# Patient Record
Sex: FEMALE | Race: BLACK OR AFRICAN AMERICAN | NOT HISPANIC OR LATINO | Employment: UNEMPLOYED | URBAN - METROPOLITAN AREA
[De-identification: names, ages, dates, MRNs, and addresses within clinical notes are randomized per-mention and may not be internally consistent; named-entity substitution may affect disease eponyms.]

---

## 2018-12-24 ENCOUNTER — HOSPITAL ENCOUNTER (EMERGENCY)
Facility: HOSPITAL | Age: 33
Discharge: HOME | End: 2018-12-24
Attending: EMERGENCY MEDICINE | Admitting: EMERGENCY MEDICINE

## 2018-12-24 VITALS
TEMPERATURE: 97.7 F | HEART RATE: 66 BPM | OXYGEN SATURATION: 98 % | RESPIRATION RATE: 16 BRPM | HEIGHT: 60 IN | DIASTOLIC BLOOD PRESSURE: 85 MMHG | BODY MASS INDEX: 26.25 KG/M2 | WEIGHT: 133.7 LBS | SYSTOLIC BLOOD PRESSURE: 125 MMHG

## 2018-12-24 DIAGNOSIS — R11.2 NON-INTRACTABLE VOMITING WITH NAUSEA, UNSPECIFIED VOMITING TYPE: ICD-10-CM

## 2018-12-24 DIAGNOSIS — R55 NEAR SYNCOPE: Primary | ICD-10-CM

## 2018-12-24 LAB
ANION GAP SERPL CALCULATED.3IONS-SCNC: 10.9 MMOL/L
BASOPHILS # BLD AUTO: 0.02 10*3/MM3 (ref 0–0.2)
BASOPHILS NFR BLD AUTO: 0.2 % (ref 0–1.5)
BUN BLD-MCNC: 10 MG/DL (ref 6–20)
BUN/CREAT SERPL: 16.1 (ref 7–25)
CALCIUM SPEC-SCNC: 9.2 MG/DL (ref 8.6–10.5)
CHLORIDE SERPL-SCNC: 99 MMOL/L (ref 98–107)
CO2 SERPL-SCNC: 27.1 MMOL/L (ref 22–29)
CREAT BLD-MCNC: 0.62 MG/DL (ref 0.57–1)
DEPRECATED RDW RBC AUTO: 46.7 FL (ref 37–54)
EOSINOPHIL # BLD AUTO: 0.02 10*3/MM3 (ref 0–0.7)
EOSINOPHIL NFR BLD AUTO: 0.2 % (ref 0.3–6.2)
ERYTHROCYTE [DISTWIDTH] IN BLOOD BY AUTOMATED COUNT: 12.7 % (ref 11.7–13)
GFR SERPL CREATININE-BSD FRML MDRD: 133 ML/MIN/1.73
GLUCOSE BLD-MCNC: 101 MG/DL (ref 65–99)
HCG SERPL QL: NEGATIVE
HCT VFR BLD AUTO: 40 % (ref 35.6–45.5)
HGB BLD-MCNC: 13.1 G/DL (ref 11.9–15.5)
IMM GRANULOCYTES # BLD AUTO: 0.02 10*3/MM3 (ref 0–0.03)
IMM GRANULOCYTES NFR BLD AUTO: 0.2 % (ref 0–0.5)
LYMPHOCYTES # BLD AUTO: 2.66 10*3/MM3 (ref 0.9–4.8)
LYMPHOCYTES NFR BLD AUTO: 33 % (ref 19.6–45.3)
MCH RBC QN AUTO: 33.1 PG (ref 26.9–32)
MCHC RBC AUTO-ENTMCNC: 32.8 G/DL (ref 32.4–36.3)
MCV RBC AUTO: 101 FL (ref 80.5–98.2)
MONOCYTES # BLD AUTO: 0.65 10*3/MM3 (ref 0.2–1.2)
MONOCYTES NFR BLD AUTO: 8.1 % (ref 5–12)
NEUTROPHILS # BLD AUTO: 4.7 10*3/MM3 (ref 1.9–8.1)
NEUTROPHILS NFR BLD AUTO: 58.3 % (ref 42.7–76)
PLATELET # BLD AUTO: 206 10*3/MM3 (ref 140–500)
PMV BLD AUTO: 9.6 FL (ref 6–12)
POTASSIUM BLD-SCNC: 4.2 MMOL/L (ref 3.5–5.2)
RBC # BLD AUTO: 3.96 10*6/MM3 (ref 3.9–5.2)
SODIUM BLD-SCNC: 137 MMOL/L (ref 136–145)
WBC NRBC COR # BLD: 8.07 10*3/MM3 (ref 4.5–10.7)

## 2018-12-24 PROCEDURE — 85025 COMPLETE CBC W/AUTO DIFF WBC: CPT | Performed by: EMERGENCY MEDICINE

## 2018-12-24 PROCEDURE — 99284 EMERGENCY DEPT VISIT MOD MDM: CPT

## 2018-12-24 PROCEDURE — 84703 CHORIONIC GONADOTROPIN ASSAY: CPT | Performed by: EMERGENCY MEDICINE

## 2018-12-24 PROCEDURE — 80048 BASIC METABOLIC PNL TOTAL CA: CPT | Performed by: EMERGENCY MEDICINE

## 2018-12-24 RX ORDER — HYDROCHLOROTHIAZIDE 12.5 MG/1
12.5 CAPSULE, GELATIN COATED ORAL DAILY
COMMUNITY
End: 2022-11-08 | Stop reason: ALTCHOICE

## 2018-12-24 RX ORDER — SODIUM CHLORIDE 0.9 % (FLUSH) 0.9 %
10 SYRINGE (ML) INJECTION AS NEEDED
Status: DISCONTINUED | OUTPATIENT
Start: 2018-12-24 | End: 2018-12-24 | Stop reason: HOSPADM

## 2018-12-24 RX ADMIN — SODIUM CHLORIDE 1000 ML: 9 INJECTION, SOLUTION INTRAVENOUS at 02:44

## 2022-11-08 ENCOUNTER — OFFICE VISIT (OUTPATIENT)
Dept: CARDIOLOGY | Facility: CLINIC | Age: 37
End: 2022-11-08

## 2022-11-08 VITALS
HEIGHT: 61 IN | HEART RATE: 63 BPM | DIASTOLIC BLOOD PRESSURE: 80 MMHG | BODY MASS INDEX: 24.73 KG/M2 | OXYGEN SATURATION: 100 % | WEIGHT: 131 LBS | SYSTOLIC BLOOD PRESSURE: 122 MMHG

## 2022-11-08 DIAGNOSIS — R06.09 DYSPNEA ON EXERTION: Primary | ICD-10-CM

## 2022-11-08 DIAGNOSIS — I10 PRIMARY HYPERTENSION: ICD-10-CM

## 2022-11-08 PROCEDURE — 93000 ELECTROCARDIOGRAM COMPLETE: CPT | Performed by: STUDENT IN AN ORGANIZED HEALTH CARE EDUCATION/TRAINING PROGRAM

## 2022-11-08 PROCEDURE — 99204 OFFICE O/P NEW MOD 45 MIN: CPT | Performed by: STUDENT IN AN ORGANIZED HEALTH CARE EDUCATION/TRAINING PROGRAM

## 2022-11-08 RX ORDER — LOSARTAN POTASSIUM AND HYDROCHLOROTHIAZIDE 25; 100 MG/1; MG/1
1 TABLET ORAL DAILY
COMMUNITY
Start: 2022-09-28

## 2022-11-08 RX ORDER — ERGOCALCIFEROL 1.25 MG/1
50000 CAPSULE ORAL WEEKLY
COMMUNITY
Start: 2022-10-19

## 2022-11-08 NOTE — PROGRESS NOTES
Subjective:     Encounter Date:2022      Patient ID: Handy Reed is a 37 y.o. female.    Chief Complaint:  Establish care    HPI:   37 y.o. female  with a past medical history significant for hypertension who presents to [facility].  Patient notes that about 5 to 6 months ago she was experiencing left-sided chest pain that radiated to her jaw.  It occurred intermittently and at random times.  At times it was associate with exertion, other times it was not.  She went to her primary care physician who obtained an EKG that was read to be abnormal so she was referred to a cardiologist.  At the cardiologist's office, she had repeat EKG that was again deemed to be abnormal and so a stress test was ordered.  Unfortunately one of her family members  around the time that the stress test was scheduled so she had to cancel and was never called to reschedule.  Over the last couple of months her symptoms have completely resolved.  She no longer has any chest pain.  She unfortunately had COVID in early September and since then has had some mild dyspnea on exertion after about 10 minutes of walking.  Otherwise she has felt well.    Of note, she denies any family history of heart disease including MIs, heart failure, sudden cardiac death.    The following portions of the patient's history were reviewed and updated as appropriate: allergies, current medications, past family history, past medical history, past social history, past surgical history and problem list.     REVIEW OF SYSTEMS:   All systems reviewed.  Pertinent positives identified in HPI.  All other systems are negative.    Past Medical History:   Diagnosis Date   • Abnormal EKG     stated in  [facility] office note-dd   • Chest pain     stated in  [facility] office note-dd   • History of cancer     ovarian-stated in  [facility] office note-dd   • HPV (human papilloma virus) anogenital infection      stated in 9-2022 [facility] office note-dd   • Hypertension     stated in 9-2022 [facility] office note-dd   • Night sweat     stated in 7-2022 [facility] office note-dd   • Stress     stated in 9-2022 [facility] office note-dd   • Vitamin D deficiency     stated in 7-2022 [facility] office note-dd       Family History   Problem Relation Age of Onset   • Hypertension Mother    • Hypertension Maternal Grandmother        Social History     Socioeconomic History   • Marital status: Single   Tobacco Use   • Smoking status: Unknown   Vaping Use   • Vaping Use: Unknown   Substance and Sexual Activity   • Alcohol use: Yes     Comment: Sometimes   • Drug use: Yes     Types: Marijuana   • Sexual activity: Defer       No Known Allergies    Past Surgical History:   Procedure Laterality Date   • TUBAL ABDOMINAL LIGATION     • WART REMOVAL           ECG 12 Lead    Date/Time: 11/8/2022 12:30 PM  Performed by: Sukh Griffith MD  Authorized by: Sukh Griffith MD   Comparison: compared with previous ECG from 10/17/2016  Comparison to previous ECG: T wave are flattened inferolaterally  Rhythm: sinus rhythm  Rate: normal  Conduction: conduction normal  T flattening: V4, V5, V6, III, aVF and II  QRS axis: normal    Clinical impression: non-specific ECG               Objective:         Vitals:    11/08/22 1152   BP: 122/80   Pulse: 63   SpO2: 100%       PHYSICAL EXAM:  GEN: well appearing, in NAD   HEENT: NCAT, EOMI, moist mucus membranes   Respiratory: CTAB, no wheezes, rales or rhonchi  CV: normal rate, regular rhythm, normal S1, S2, no murmurs, rubs, gallops, +2 radial pulses b/l  GI: soft, nontender, nondistended  MSK: no edema  Skin: no rash, warm, dry  Heme/Lymph: no bruising or bleeding  Psych: organized thought, normal behavior and affect  Neuro: Alert and Oriented x 3, grossly normal motor function        Assessment:         (R06.09) Dyspnea on exertion    (I10) Primary  hypertension       Plan:       #Dyspnea on exertion  Patient previously had episodes of chest pain that have resolved on their own.  She has not had any episodes in the last 4 to 5 months.  She had COVID in early September and since then has had mild dyspnea on exertion.  Her EKG is normal sinus rhythm with nonspecific ST-T flattening in the inferolateral leads.  She is low risk for any heart disease as her only risk factor is hypertension.  Her dyspnea exertion is likely secondary to her recent COVID infection.  - Defer any further cardiac testing at this time  - Patient to return if her symptoms worsen or if her chest pain returns    #Hypertension  Currently very well controlled.  -Continue losartan-hydrochlorothiazide 100-25 mg daily  -Continue amlodipine 10 mg daily    Hannah Aquino, thank you very much for referring this kind patient to me. Please call me with any questions or concerns. I will see the patient again in the office as needed.         Sukh Griffith MD  11/08/22  [facility]    Outpatient Encounter Medications as of 11/8/2022   Medication Sig Dispense Refill   • losartan-hydrochlorothiazide (HYZAAR) 100-25 MG per tablet Take 1 tablet by mouth Daily.     • vitamin D (ERGOCALCIFEROL) 1.25 MG (57106 UT) capsule capsule Take 1 capsule by mouth 1 (One) Time Per Week.     • [DISCONTINUED] hydrochlorothiazide (MICROZIDE) 12.5 MG capsule Take 12.5 mg by mouth Daily.       No facility-administered encounter medications on file as of 11/8/2022.

## 2023-03-25 ENCOUNTER — HOSPITAL ENCOUNTER (EMERGENCY)
Facility: HOSPITAL | Age: 38
Discharge: HOME | End: 2023-03-25
Attending: EMERGENCY MEDICINE | Admitting: EMERGENCY MEDICINE
Payer: COMMERCIAL

## 2023-03-25 ENCOUNTER — APPOINTMENT (OUTPATIENT)
Dept: GENERAL RADIOLOGY | Facility: HOSPITAL | Age: 38
End: 2023-03-25
Payer: COMMERCIAL

## 2023-03-25 VITALS
SYSTOLIC BLOOD PRESSURE: 123 MMHG | HEIGHT: 59 IN | OXYGEN SATURATION: 99 % | DIASTOLIC BLOOD PRESSURE: 84 MMHG | BODY MASS INDEX: 24.19 KG/M2 | HEART RATE: 73 BPM | TEMPERATURE: 97 F | WEIGHT: 120 LBS | RESPIRATION RATE: 14 BRPM

## 2023-03-25 DIAGNOSIS — R07.89 ATYPICAL CHEST PAIN: Primary | ICD-10-CM

## 2023-03-25 LAB
ALBUMIN SERPL-MCNC: 4.6 G/DL (ref 3.5–5.2)
ALBUMIN/GLOB SERPL: 1.5 G/DL
ALP SERPL-CCNC: 69 U/L (ref 39–117)
ALT SERPL W P-5'-P-CCNC: 19 U/L (ref 1–33)
ANION GAP SERPL CALCULATED.3IONS-SCNC: 11.8 MMOL/L (ref 5–15)
AST SERPL-CCNC: 20 U/L (ref 1–32)
BASOPHILS # BLD AUTO: 0.04 10*3/MM3 (ref 0–0.2)
BASOPHILS NFR BLD AUTO: 0.7 % (ref 0–1.5)
BILIRUB SERPL-MCNC: 0.2 MG/DL (ref 0–1.2)
BUN SERPL-MCNC: 12 MG/DL (ref 6–20)
BUN/CREAT SERPL: 17.6 (ref 7–25)
CALCIUM SPEC-SCNC: 9.5 MG/DL (ref 8.6–10.5)
CHLORIDE SERPL-SCNC: 97 MMOL/L (ref 98–107)
CO2 SERPL-SCNC: 28.2 MMOL/L (ref 22–29)
CREAT SERPL-MCNC: 0.68 MG/DL (ref 0.57–1)
DEPRECATED RDW RBC AUTO: 42.7 FL (ref 37–54)
EGFRCR SERPLBLD CKD-EPI 2021: 113.8 ML/MIN/1.73
EOSINOPHIL # BLD AUTO: 0.04 10*3/MM3 (ref 0–0.4)
EOSINOPHIL NFR BLD AUTO: 0.7 % (ref 0.3–6.2)
ERYTHROCYTE [DISTWIDTH] IN BLOOD BY AUTOMATED COUNT: 11.7 % (ref 12.3–15.4)
GLOBULIN UR ELPH-MCNC: 3.1 GM/DL
GLUCOSE SERPL-MCNC: 119 MG/DL (ref 65–99)
HCT VFR BLD AUTO: 38.7 % (ref 34–46.6)
HGB BLD-MCNC: 12.2 G/DL (ref 12–15.9)
IMM GRANULOCYTES # BLD AUTO: 0.01 10*3/MM3 (ref 0–0.05)
IMM GRANULOCYTES NFR BLD AUTO: 0.2 % (ref 0–0.5)
LYMPHOCYTES # BLD AUTO: 2.71 10*3/MM3 (ref 0.7–3.1)
LYMPHOCYTES NFR BLD AUTO: 47 % (ref 19.6–45.3)
MCH RBC QN AUTO: 31.6 PG (ref 26.6–33)
MCHC RBC AUTO-ENTMCNC: 31.5 G/DL (ref 31.5–35.7)
MCV RBC AUTO: 100.3 FL (ref 79–97)
MONOCYTES # BLD AUTO: 0.52 10*3/MM3 (ref 0.1–0.9)
MONOCYTES NFR BLD AUTO: 9 % (ref 5–12)
NEUTROPHILS NFR BLD AUTO: 2.44 10*3/MM3 (ref 1.7–7)
NEUTROPHILS NFR BLD AUTO: 42.4 % (ref 42.7–76)
NRBC BLD AUTO-RTO: 0 /100 WBC (ref 0–0.2)
PLATELET # BLD AUTO: 283 10*3/MM3 (ref 140–450)
PMV BLD AUTO: 10.1 FL (ref 6–12)
POTASSIUM SERPL-SCNC: 3.1 MMOL/L (ref 3.5–5.2)
PROT SERPL-MCNC: 7.7 G/DL (ref 6–8.5)
QT INTERVAL: 431 MS
RBC # BLD AUTO: 3.86 10*6/MM3 (ref 3.77–5.28)
SODIUM SERPL-SCNC: 137 MMOL/L (ref 136–145)
TROPONIN T SERPL HS-MCNC: 7 NG/L
WBC NRBC COR # BLD: 5.76 10*3/MM3 (ref 3.4–10.8)

## 2023-03-25 PROCEDURE — 36415 COLL VENOUS BLD VENIPUNCTURE: CPT

## 2023-03-25 PROCEDURE — 84484 ASSAY OF TROPONIN QUANT: CPT | Performed by: PHYSICIAN ASSISTANT

## 2023-03-25 PROCEDURE — 93010 ELECTROCARDIOGRAM REPORT: CPT | Performed by: INTERNAL MEDICINE

## 2023-03-25 PROCEDURE — 93005 ELECTROCARDIOGRAM TRACING: CPT

## 2023-03-25 PROCEDURE — 99283 EMERGENCY DEPT VISIT LOW MDM: CPT

## 2023-03-25 PROCEDURE — 80053 COMPREHEN METABOLIC PANEL: CPT | Performed by: PHYSICIAN ASSISTANT

## 2023-03-25 PROCEDURE — 93005 ELECTROCARDIOGRAM TRACING: CPT | Performed by: EMERGENCY MEDICINE

## 2023-03-25 PROCEDURE — 85025 COMPLETE CBC W/AUTO DIFF WBC: CPT | Performed by: PHYSICIAN ASSISTANT

## 2023-03-25 PROCEDURE — 71045 X-RAY EXAM CHEST 1 VIEW: CPT

## 2023-03-25 RX ORDER — POTASSIUM CHLORIDE 750 MG/1
40 TABLET, FILM COATED, EXTENDED RELEASE ORAL ONCE
Status: COMPLETED | OUTPATIENT
Start: 2023-03-25 | End: 2023-03-25

## 2023-03-25 RX ORDER — AMLODIPINE BESYLATE 10 MG/1
1 TABLET ORAL DAILY
COMMUNITY
Start: 2023-03-03

## 2023-03-25 RX ADMIN — POTASSIUM CHLORIDE 20 MEQ: 750 TABLET, EXTENDED RELEASE ORAL at 03:17

## 2023-03-25 NOTE — DISCHARGE INSTRUCTIONS
Return to the ER with any further concerns, should your condition change/worsen, or should you develop any new symptoms we did not address at today's visit.

## 2023-03-25 NOTE — ED PROVIDER NOTES
EMERGENCY DEPARTMENT ENCOUNTER    Room Number:  01/01  Date seen:  3/25/2023  PCP: Hannah Aquino APRN      HPI:  Chief Complaint: Chest pain  A complete HPI/ROS/PMH/PSH/SH/FH are unobtainable due to: Nothing  Context: Handy Reed is a 37 y.o. female who presents to the ED c/o chest pain that began a few hours PTA.  Patient states she feels as if it was likely panic attack and states she is under a lot of stress.  Pain was said to be sharp, she states she felt short of breath and that there was some perioral tingling associated with the chest discomfort.  The stress is said to be coming from the home front.  Patient does not want volunteer further information at this time the states she is not SI or HI and feels safe for discharge home if work-up is negative.    Patient denies history of coronary disease, congenital cardiac defect, history of murmur, unilateral leg swelling, recent travel, recent injury, hemoptysis, and estrogen use.  Patient does have a PMH of hypertension that is controlled with amlodipine and Hyzaar.  She denies pregnancy.    Patient is pain-free at this time and has been pain-free since just prior to arrival in the emergency department.        PAST MEDICAL HISTORY  Active Ambulatory Problems     Diagnosis Date Noted   • No Active Ambulatory Problems     Resolved Ambulatory Problems     Diagnosis Date Noted   • No Resolved Ambulatory Problems     Past Medical History:   Diagnosis Date   • Abnormal EKG    • Chest pain    • History of cancer    • HPV (human papilloma virus) anogenital infection    • Hypertension    • Night sweat    • Stress    • Vitamin D deficiency          PAST SURGICAL HISTORY  Past Surgical History:   Procedure Laterality Date   • TUBAL ABDOMINAL LIGATION     • WART REMOVAL           FAMILY HISTORY  Family History   Problem Relation Age of Onset   • Hypertension Mother    • Hypertension Maternal Grandmother          SOCIAL HISTORY  Social History      Socioeconomic History   • Marital status: Single   Tobacco Use   • Smoking status: Unknown   Vaping Use   • Vaping Use: Unknown   Substance and Sexual Activity   • Alcohol use: Yes     Comment: Sometimes   • Drug use: Yes     Types: Marijuana   • Sexual activity: Defer         ALLERGIES  Patient has no known allergies.        REVIEW OF SYSTEMS  Review of Systems     All systems reviewed and negative except for those discussed in HPI.       PHYSICAL EXAM  ED Triage Vitals [03/25/23 0045]   Temp Heart Rate Resp BP SpO2   97 °F (36.1 °C) 73 14 123/84 99 %      Temp src Heart Rate Source Patient Position BP Location FiO2 (%)   Tympanic Monitor Lying Left arm --       Physical Exam      GENERAL: Very pleasant, nontoxic, not distressed  HENT: nares patent  EYES: no scleral icterus  CV: regular rhythm, normal rate, normal S1-S2, no unilateral leg swelling or pedal edema  RESPIRATORY: normal effort, lungs CTA B  ABDOMEN: soft, nontender  MUSCULOSKELETAL: no deformity, chest pain not reproducible  NEURO: alert, moves all extremities, follows commands  PSYCH:  calm, cooperative  SKIN: warm, dry, no skin rash to the chest wall    Vital signs and nursing notes reviewed.          LAB RESULTS  Recent Results (from the past 24 hour(s))   ECG 12 Lead Chest Pain    Collection Time: 03/25/23 12:48 AM   Result Value Ref Range    QT Interval 431 ms   Comprehensive Metabolic Panel    Collection Time: 03/25/23  1:51 AM    Specimen: Arm, Left; Blood   Result Value Ref Range    Glucose 119 (H) 65 - 99 mg/dL    BUN 12 6 - 20 mg/dL    Creatinine 0.68 0.57 - 1.00 mg/dL    Sodium 137 136 - 145 mmol/L    Potassium 3.1 (L) 3.5 - 5.2 mmol/L    Chloride 97 (L) 98 - 107 mmol/L    CO2 28.2 22.0 - 29.0 mmol/L    Calcium 9.5 8.6 - 10.5 mg/dL    Total Protein 7.7 6.0 - 8.5 g/dL    Albumin 4.6 3.5 - 5.2 g/dL    ALT (SGPT) 19 1 - 33 U/L    AST (SGOT) 20 1 - 32 U/L    Alkaline Phosphatase 69 39 - 117 U/L    Total Bilirubin 0.2 0.0 - 1.2 mg/dL     Globulin 3.1 gm/dL    A/G Ratio 1.5 g/dL    BUN/Creatinine Ratio 17.6 7.0 - 25.0    Anion Gap 11.8 5.0 - 15.0 mmol/L    eGFR 113.8 >60.0 mL/min/1.73   Single High Sensitivity Troponin T    Collection Time: 03/25/23  1:51 AM    Specimen: Arm, Left; Blood   Result Value Ref Range    HS Troponin T 7 <10 ng/L   CBC Auto Differential    Collection Time: 03/25/23  1:51 AM    Specimen: Arm, Left; Blood   Result Value Ref Range    WBC 5.76 3.40 - 10.80 10*3/mm3    RBC 3.86 3.77 - 5.28 10*6/mm3    Hemoglobin 12.2 12.0 - 15.9 g/dL    Hematocrit 38.7 34.0 - 46.6 %    .3 (H) 79.0 - 97.0 fL    MCH 31.6 26.6 - 33.0 pg    MCHC 31.5 31.5 - 35.7 g/dL    RDW 11.7 (L) 12.3 - 15.4 %    RDW-SD 42.7 37.0 - 54.0 fl    MPV 10.1 6.0 - 12.0 fL    Platelets 283 140 - 450 10*3/mm3    Neutrophil % 42.4 (L) 42.7 - 76.0 %    Lymphocyte % 47.0 (H) 19.6 - 45.3 %    Monocyte % 9.0 5.0 - 12.0 %    Eosinophil % 0.7 0.3 - 6.2 %    Basophil % 0.7 0.0 - 1.5 %    Immature Grans % 0.2 0.0 - 0.5 %    Neutrophils, Absolute 2.44 1.70 - 7.00 10*3/mm3    Lymphocytes, Absolute 2.71 0.70 - 3.10 10*3/mm3    Monocytes, Absolute 0.52 0.10 - 0.90 10*3/mm3    Eosinophils, Absolute 0.04 0.00 - 0.40 10*3/mm3    Basophils, Absolute 0.04 0.00 - 0.20 10*3/mm3    Immature Grans, Absolute 0.01 0.00 - 0.05 10*3/mm3    nRBC 0.0 0.0 - 0.2 /100 WBC       Ordered the above labs and reviewed the results.        RADIOLOGY  XR Chest 1 View    Result Date: 3/25/2023  Patient: DAVID DUCKWORTH  Time Out: 02:40 Exam(s): FILM CXR 1 VIEW EXAM:   XR Chest, 1 View CLINICAL HISTORY:    Reason for exam: Chest pain. TECHNIQUE:   Frontal view of the chest. COMPARISON:   No relevant prior studies available. FINDINGS:   Lungs:  No consolidation or mass.   Pleural space:  No acute findings   Heart:  No cardiomegaly.   Bones joints:  No acute findings. IMPRESSION:       No acute cardiopulmonary process.     Electronically signed by Shirley Harrison MD on 03-25-23 at 0240      Ordered the  above noted radiological studies. Reviewed by me in PACS.          PROCEDURES  Procedures          MEDICATIONS GIVEN IN ER  Medications   potassium chloride (K-DUR,KLOR-CON) ER tablet 40 mEq (20 mEq Oral Given 3/25/23 0317)         MEDICAL DECISION MAKING, PROGRESS, and CONSULTS    Discussion below represents my analysis of pertinent findings related to patient's condition, differential diagnosis, treatment plan and final disposition.      Orders placed during this visit:  Orders Placed This Encounter   Procedures   • XR Chest 1 View   • Comprehensive Metabolic Panel   • Single High Sensitivity Troponin T   • CBC Auto Differential   • ECG 12 Lead Chest Pain   • CBC & Differential         Additional sources:  - Discussed/obtained information from independent historians: None available  Additional information was obtained to confirm the patient's history.    - External (non-ED) record review: She was seen 11/8/2022 by a cardiologist named Dr. Calix.  According to his dictation the patient has a significant history of hypertension and stated that over the course of the past 5 to 6 months she was having intermittent chest pain radiating to her jaw.  Stress test was ordered.  After a thorough history and physical exam it was felt further cardiac testing can be deferred at the time of the visit.  She was to return should her symptoms worsen or should she have any further concerns.       - Chronic or social conditions impacting care: None        Differential diagnosis:    Panic attack,, ACS, PTX, PE, PNA, chest wall pain, pneumomediastinum, GI mediated chest pain, less likely aortic dissection.  History is very atypical as the pain was sharp took place during a severe bout of anxiety and subsided and she calm down.  Suspect this was a panic attack.  However, patient does have some nonspecific T wave changes in the inferior lateral leads on EKG which was a present on 11/8/2022 at her cardiologist appointment.  She does have  a past medical history of hypertension as well.  We will go ahead and obtain CBC, CMP, troponin, EKG, portable chest x-ray.            Independent interpretation of labs, radiology studies, and discussions with consultants:  ED Course as of 03/25/23 0418   Sat Mar 25, 2023   0101 BP: 123/84 [RC]   0101 Temp: 97 °F (36.1 °C) [RC]   0101 Heart Rate: 73 [RC]   0101 Resp: 14 [RC]   0101 SpO2: 99 %  RA [RC]   0106 BP: 123/84 [RC]   0106 Temp: 97 °F (36.1 °C) [RC]   0106 Heart Rate: 73 [RC]   0106 SpO2: 99 %  RA [RC]   0207 EKG      slightly limited by artifact    EKG time: 302  Rhythm/Rate: 87/sinus  P waves and IL: Normal IL interval  QRS, axis: Normal axis  ST and T waves: Non-specific T wave changes in inferior lateral leads.  No acute ST changes    Interpreted Contemporaneously by me, independently viewed  -Unchanged when compared to 11/13/2020 [RC]   0250 My independent interpretation of the patient's chest x-ray is no pneumothorax, no pneumomediastinum, no pulmonary infiltrates, no pleural effusions, no cardiomegaly, normal mediastinum, no acute process.  Radiologist official interpretation is no acute process. [RC]   0311 WBC: 5.76 [RC]   0311 RBC: 3.86 [RC]   0311 Hemoglobin: 12.2 [RC]   0311 Hematocrit: 38.7 [RC]   0311 Platelets: 283 [RC]   0311 Glucose(!): 119 [RC]   0311 BUN: 12 [RC]   0311 Creatinine: 0.68 [RC]   0311 Sodium: 137 [RC]   0311 Potassium(!): 3.1 [RC]   0311 CO2: 28.2 [RC]   0311 Anion Gap: 11.8 [RC]   0311 HS Troponin T: 7 [RC]   0311 HEART SCORE    History Slightly or non-suspicious (0)  ECG Nonspecific repol disturbance (1)  Age < or = 45 (0)  Risk factors No risk factors (0)  Troponin < or = Normal limit (0)    This patient's HEART score is 1    HEART Score Key:  Scores 0-3: 0.9-1.7% risk of adverse cardiac event. In the HEART Score study, these patients were discharged (0.99% in the retrospective study, 1.7% in the prospective study)  Scores 4-6: 12-16.6% risk of adverse cardiac event. In  the HEART Score study, these patients were admitted to the hospital. (11.6% retrospective, 16.6% prospective)  Scores ?7: 50-65% risk of adverse cardiac event. In the HEART Score study, these patients were candidates for early invasive measures. (65.2% retrospective, 50.1% prospective)    [RC]   0313 Patient's heart score is 1.  Her pain is very atypical.  Her potassium is noted to be slightly low.  I suspect patient is correct and this is likely anxiety. [RC]   0313 She has been pain-free throughout her ER stay.  Will DC home at this time and provide with a cardiology referral should she want further evaluation.  We will otherwise have her to follow-up with her family physician for further management. [RC]      ED Course User Index  [RC] Aubrey Acevedo III, PA               PPE: The patient wore a mask throughout the entire encounter. I wore a well-fitting mask.    DIAGNOSIS  Final diagnoses:   Atypical chest pain         DISPOSITION  DISCHARGE    Patient discharged in stable condition.    Reviewed implications of results, diagnosis, meds, responsibility to follow up, warning signs and symptoms of possible worsening, potential complications and reasons to return to ER.    Patient/Family voiced understanding of above instructions.    Discussed plan for discharge, as there is no emergent indication for admission. Patient referred to primary care provider for BP management due to today's BP. Pt/family is agreeable and understands need for follow up and repeat testing.  Pt is aware that discharge does not mean that nothing is wrong but it indicates no emergency is present that requires admission and they must continue care with follow-up as given below or physician of their choice.     FOLLOW-UP  Hannah Aquino, ELVA  [address]  [place] [id number] [phone]    Schedule an appointment as soon as possible for a visit   For further evaluation and treatment    [facility]  [facility]  [address]  [address]  [facility] [id number] [phone]  Schedule an appointment as soon as possible for a visit   For further evaluation and treatment         Medication List      No changes were made to your prescriptions during this visit.           Latest Documented Vital Signs:  As of 04:18 EDT  BP- 123/84 HR- 73 Temp- 97 °F (36.1 °C) (Tympanic) O2 sat- 99%      --    Please note that portions of this were completed with a voice recognition program.       Note Disclaimer: At [facility], we believe that sharing information builds trust and better relationships. You are receiving this note because you are receiving care at [facility] or recently visited. It is possible you will see health information before a provider has talked with you about it. This kind of information can be easy to misunderstand. To help you fully understand what it means for your health, we urge you to discuss this note with your provider.       Aubrey Acevedo III, PA  03/25/23 041

## 2023-03-25 NOTE — ED PROVIDER NOTES
MD ATTESTATION NOTE    The MAURICIO and I have discussed this patient's history, physical exam, and treatment plan.  I have reviewed the documentation and personally had a face to face interaction with the patient. I affirm the documentation and agree with the treatment and plan.  The attached note describes my personal findings.      I provided a substantive portion of the care of the patient.  I personally performed the physical exam in its entirety, and below are my findings.  For this patient encounter, the patient wore surgical mask, I wore full protective PPE including N95 and eye protection.      Brief HPI: This patient is a 37-year-old female presenting to the emergency room today with chest discomfort that began several hours ago.  She has been highly anxious today and under quite a bit of stress.  She does report that the pain is made worse by deep inspiration and associated with some shortness of breath.  She denies nausea/vomiting, back pain, extremity pain, or diaphoresis.      PHYSICAL EXAM  ED Triage Vitals [03/25/23 0045]   Temp Heart Rate Resp BP SpO2   97 °F (36.1 °C) 73 14 123/84 99 %      Temp src Heart Rate Source Patient Position BP Location FiO2 (%)   Tympanic Monitor Lying Left arm --         GENERAL: Resting comfortably and in no acute distress, nontoxic in appearance  HENT: nares patent  EYES: no scleral icterus  CV: regular rhythm, normal rate, no M/R/G  RESPIRATORY: normal effort, lungs clear bilaterally  ABDOMEN: soft, nontender, no rebound or guarding  MUSCULOSKELETAL: no deformity, no edema  NEURO: alert, moves all extremities, follows commands  PSYCH:  calm, cooperative  SKIN: warm, dry    Vital signs and nursing notes reviewed.        Plan: We will obtain an EKG, labs with cardiac enzyme testing, as well as a chest x-ray in the ED today.  We will monitor and reassess following.       Rafy Morales MD  03/25/23 0256

## 2023-03-25 NOTE — ED TRIAGE NOTES
"Pt ambulatory to triage from home with c/o chest pain that started 1 hour prior to arrival.  Pt points to mid sternal.  While performing ekg, pt states \"my son is driving me nuts\" and had tears in her eyes.  Pt denies any cardiac history.  Pt wearing mask in triage. Triage personnel wore appropriate PPE    "

## 2023-03-31 ENCOUNTER — OFFICE VISIT (OUTPATIENT)
Dept: OBSTETRICS AND GYNECOLOGY | Age: 38
End: 2023-03-31
Payer: COMMERCIAL

## 2023-03-31 VITALS
HEIGHT: 59 IN | SYSTOLIC BLOOD PRESSURE: 120 MMHG | BODY MASS INDEX: 26.41 KG/M2 | DIASTOLIC BLOOD PRESSURE: 72 MMHG | WEIGHT: 131 LBS

## 2023-03-31 DIAGNOSIS — Z11.51 SPECIAL SCREENING EXAMINATION FOR HUMAN PAPILLOMAVIRUS (HPV): ICD-10-CM

## 2023-03-31 DIAGNOSIS — Z13.0 SCREENING, ANEMIA, DEFICIENCY, IRON: ICD-10-CM

## 2023-03-31 DIAGNOSIS — Z12.4 SCREENING FOR MALIGNANT NEOPLASM OF THE CERVIX: ICD-10-CM

## 2023-03-31 DIAGNOSIS — R10.2 PELVIC PAIN IN FEMALE: ICD-10-CM

## 2023-03-31 DIAGNOSIS — Z01.419 WELL WOMAN EXAM WITH ROUTINE GYNECOLOGICAL EXAM: Primary | ICD-10-CM

## 2023-03-31 DIAGNOSIS — Z11.3 SCREENING FOR STD (SEXUALLY TRANSMITTED DISEASE): ICD-10-CM

## 2023-03-31 DIAGNOSIS — N94.6 DYSMENORRHEA: ICD-10-CM

## 2023-03-31 DIAGNOSIS — Z13.228 SCREENING FOR METABOLIC DISORDER: ICD-10-CM

## 2023-03-31 DIAGNOSIS — N94.10 DYSPAREUNIA IN FEMALE: ICD-10-CM

## 2023-03-31 DIAGNOSIS — D25.1 FIBROIDS, INTRAMURAL: ICD-10-CM

## 2023-03-31 PROBLEM — I10 ESSENTIAL HYPERTENSION: Status: ACTIVE | Noted: 2023-03-31

## 2023-03-31 LAB
ERYTHROCYTE [DISTWIDTH] IN BLOOD BY AUTOMATED COUNT: 11.7 % (ref 12.3–15.4)
HCT VFR BLD AUTO: 38.4 % (ref 34–46.6)
HGB BLD-MCNC: 12.5 G/DL (ref 12–15.9)
MCH RBC QN AUTO: 32.6 PG (ref 26.6–33)
MCHC RBC AUTO-ENTMCNC: 32.6 G/DL (ref 31.5–35.7)
MCV RBC AUTO: 100.3 FL (ref 79–97)
PLATELET # BLD AUTO: 242 10*3/MM3 (ref 140–450)
RBC # BLD AUTO: 3.83 10*6/MM3 (ref 3.77–5.28)
WBC # BLD AUTO: 5.59 10*3/MM3 (ref 3.4–10.8)

## 2023-03-31 NOTE — PROGRESS NOTES
[facility]   Obstetrics and Gynecology     3/31/2023    Patient: Handy Reed          MR#:[id number]    History of Present Illness    Chief Complaint   Patient presents with   • Gynecologic Exam     CC: New annual, has U/S today to check fibroids, unable to void       37 y.o. female  who presents needing annual exam.  Multiple complaints including severe dysmenorrhea, regular pelvic pain that is longstanding and discomfort with intimacy.  The patient has had uterine fibroids for some time.    The patient has a history of abnormal EKG for which the work-up was negative and well-controlled benign hypertension.    Studies reviewed:  US Non-ob Transvaginal (2023 09:46)      Patient's last menstrual period was 2023.  Obstetric History:  OB History        3    Para   3    Term   3            AB        Living   3       SAB        IAB        Ectopic        Molar        Multiple        Live Births   3               Menstrual History:     Patient's last menstrual period was 2023.       Sexual History:       Social History     Substance and Sexual Activity   Sexual Activity Defer     ______________________________________  Patient Active Problem List   Diagnosis   • Pelvic pain in female   • Fibroids, intramural   • Dysmenorrhea   • Dyspareunia in female   • Essential hypertension     Past Medical History:   Diagnosis Date   • History of cancer     ovarian-stated in  [facility] [place] 2[place] office note-dd   • HPV (human papilloma virus) anogenital infection     stated in  [facility] [place][note 2] office note-dd   • Hypertension     stated in  [facility] [place][note 2] office note-dd   • Stress     stated in  [facility] [place][note 2] office note-dd   • Vitamin D deficiency     stated in  [facility] [place][note 2] office note-dd     Past Surgical History:   Procedure Laterality Date   • TUBAL ABDOMINAL LIGATION     • WART REMOVAL       Social History  "    Tobacco Use   Smoking Status Unknown   Smokeless Tobacco Not on file     Family History   Problem Relation Age of Onset   • Hypertension Mother    • Hypertension Maternal Grandmother    • Breast cancer Maternal Aunt    • Ovarian cancer Neg Hx    • Uterine cancer Neg Hx    • Colon cancer Neg Hx      Prior to Admission medications    Medication Sig Start Date End Date Taking? Authorizing Provider   amLODIPine (NORVASC) 10 MG tablet Take 1 tablet by mouth Daily. 3/3/23  Yes Mehdi Castillo MD   losartan-hydrochlorothiazide (HYZAAR) 100-25 MG per tablet Take 1 tablet by mouth Daily. 9/28/22  Yes Mehdi Castillo MD   vitamin D (ERGOCALCIFEROL) 1.25 MG (84769 UT) capsule capsule Take 1 capsule by mouth 1 (One) Time Per Week. 10/19/22  Yes Mehdi Castillo MD     _______________________________________    Current contraception: tubal ligation  History of abnormal Pap smear: no  Family history of uterine or ovarian cancer: no  Family History of colon cancer/colon polyps: no  History of abnormal mammogram: no  History of abnormal lipids: no    The following portions of the patient's history were reviewed and updated as appropriate: allergies, current medications, past family history, past medical history, past social history, past surgical history and problem list.    Review of Systems    Pertinent items are noted in HPI.       Objective   Physical Exam    /72   Ht 149.9 cm (59\")   Wt 59.4 kg (131 lb)   LMP 03/13/2023   BMI 26.46 kg/m²    BP Readings from Last 3 Encounters:   03/31/23 120/72   03/25/23 123/84   11/08/22 122/80      Wt Readings from Last 3 Encounters:   03/31/23 59.4 kg (131 lb)   03/25/23 54.4 kg (120 lb)   11/08/22 59.4 kg (131 lb)        BMI: Estimated body mass index is 26.46 kg/m² as calculated from the following:    Height as of this encounter: 149.9 cm (59\").    Weight as of this encounter: 59.4 kg (131 lb).       General: alert, appears stated age and cooperative "   Heart: regular rate and rhythm, S1, S2 normal, no murmur, click, rub or gallop   Lungs: clear to auscultation bilaterally   Abdomen: soft, non-tender, without masses, no organomegaly   Breast: inspection negative, no nipple discharge or bleeding, no masses or nodularity palpable   External genitalia/Vulva: External genitalia including bartholin's glands, Urethra, [place]'s gland and urethra meatus are normal, Perineum, rectum and anus appear normal  and Bladder appears normal without significant prolapse    Vagina: normal mucosa, normal discharge   Cervix: no lesions   Uterus: bulky, mobile, size consistent with 12 weeks and exceptionally tender   Adnexa: normal adnexa     As part of wellness and prevention, the following topics were discussed with the patient:  Encouraged self breast exam  Physical activity and regular exercised encouraged.               Assessment:  Diagnoses and all orders for this visit:    1. Well woman exam with routine gynecological exam (Primary)  -     IGP, Apt HPV,rfx 16 / 18,45    2. Screening for malignant neoplasm of the cervix  -     IGP, Apt HPV,rfx 16 / 18,45    3. Special screening examination for human papillomavirus (HPV)  -     IGP, Apt HPV,rfx 16 / 18,45    4. Pelvic pain in female    5. Fibroids, intramural    6. Dyspareunia in female    7. Dysmenorrhea    8. Screening for STD (sexually transmitted disease)  -     Cancel: NuSwab VG+ - Swab, Vagina; Future  -     Cancel: NuSwab VG+ - Swab, Vagina; Future  -     NuSwab VG+ - Swab, Vagina    9. Screening, anemia, deficiency, iron  -     CBC (No Diff)    10. Screening for metabolic disorder      Decent sized fibroids that are very tender on exam.  Talked about the limited conservative options available.  With no future desire for childbearing suggest minimally invasive hysterectomy and discussed multiple approaches to minimally invasive hysterectomy.  Patient wishes to consider    Plan:  Return if symptoms worsen or fail to  improve, for Annual GYN exam.    Kobi Anderson MD  3/31/2023 10:22 EDT

## 2023-03-31 NOTE — PATIENT INSTRUCTIONS
Minimally invasive hysterectomy refers to a technique whereby  the uterus is removed through very small incisions using the laparoscope and other tools.  The laparoscope is a lighted instrument that enters the abdomen through a small incision (usually the umbilicus).    These techniques significantly improve post operative discomfort and make recovery much quicker.  Traditional recovery from an open hysterectomy can be 6-8 weeks.  The minimally invasive approach usually requires only 2-3 weeks for recovery.    There are several versions of the minimally invasive approaches:    Total laparoscopic hysterectomy (TLH) - removal of the entire uterus and cervix through small abdominal incisions.  This approach can include or exclude the ovaries as the patient wishes  Laparoscopic supracervical hysterectomy (LSH) - supracervical means above the cervix so this technique removes the uterus just above the cervix through small laparoscopic incisions.  Leaving the cervix can help with maintaining structural support for the vagina in later life.  This surgery can be modified to leave to remove the ovaries as the patient wishes  Laparoscopically assisted vaginal hysterectomy (LAVH) - removal of the uterus using the laparoscope to perform the upper portion then delivery of the uterus through the vaginal with vaginal close of the upper vagina.      In all of these approaches, studies suggests that removing the fallopian tubes may decrease post operative complications and malignancy that can originate from the tubes later in life.  Removing the tubes does not mean the ovaries have to be removed.  In most situations, we suggest that ovaries be retained (ovarian conservation) for the hormonal benefits.    Supracervical hysterectomy is a very useful approach when scaring of the bladder flap increases the risk of bladder injury and the cervix needs to remain.  Dissection of the bladder off the anterior cervix is needed for total  laparoscopic hysterectomy.  BARRINGTON is a good option when removal of the cervix is precluding and using the vaginal as an exit for the uterus is no longer an option.  In this setting, the uterus is placed in a special bag and removed through a small but extended incision below the umbilicus.      The routine risks of these surgical procedures include bleeding, infection, possibility of damage to surrounding structures and anesthesia complications.  Minimally invasive procedures work closer to the ureters (tubes that drain the kidney to the bladder) to isolate the blood supply to the uterus, so we often check the bladder and ureters at the conclusion of the case to insure they are draining appropriately.  This procedure is called cystoscopy.     Most women undergoing a minimally invasive hysterectomy are discharged from the hospital the next day.

## 2023-04-03 ENCOUNTER — PATIENT ROUNDING (BHMG ONLY) (OUTPATIENT)
Dept: OBSTETRICS AND GYNECOLOGY | Age: 38
End: 2023-04-03
Payer: COMMERCIAL

## 2023-04-03 DIAGNOSIS — B96.89 BV (BACTERIAL VAGINOSIS): Primary | ICD-10-CM

## 2023-04-03 DIAGNOSIS — N76.0 BV (BACTERIAL VAGINOSIS): Primary | ICD-10-CM

## 2023-04-03 LAB
A VAGINAE DNA VAG QL NAA+PROBE: ABNORMAL SCORE
BVAB2 DNA VAG QL NAA+PROBE: ABNORMAL SCORE
C ALBICANS DNA VAG QL NAA+PROBE: NEGATIVE
C GLABRATA DNA VAG QL NAA+PROBE: NEGATIVE
C TRACH DNA VAG QL NAA+PROBE: NEGATIVE
MEGA1 DNA VAG QL NAA+PROBE: ABNORMAL SCORE
N GONORRHOEA DNA VAG QL NAA+PROBE: NEGATIVE
T VAGINALIS DNA VAG QL NAA+PROBE: NEGATIVE

## 2023-04-03 RX ORDER — METRONIDAZOLE 500 MG/1
500 TABLET ORAL 2 TIMES DAILY
Qty: 14 TABLET | Refills: 0 | Status: SHIPPED | OUTPATIENT
Start: 2023-04-03 | End: 2023-04-10

## 2023-04-03 NOTE — PROGRESS NOTES
A MY CHART MESSAGE HAS BEEN SENT TO THE PATIENT FOR [facility] [place] [place] ROUND[place].

## 2023-04-06 LAB
CYTOLOGIST CVX/VAG CYTO: NORMAL
CYTOLOGY CVX/VAG DOC CYTO: NORMAL
CYTOLOGY CVX/VAG DOC THIN PREP: NORMAL
DX ICD CODE: NORMAL
HIV 1 & 2 AB SER-IMP: NORMAL
HPV I/H RISK 4 DNA CVX QL PROBE+SIG AMP: NEGATIVE
OTHER STN SPEC: NORMAL
STAT OF ADQ CVX/VAG CYTO-IMP: NORMAL

## 2025-07-02 ENCOUNTER — HOSPITAL ENCOUNTER (EMERGENCY)
Facility: HOSPITAL | Age: 40
Discharge: HOME | End: 2025-07-02
Attending: EMERGENCY MEDICINE | Admitting: EMERGENCY MEDICINE
Payer: COMMERCIAL

## 2025-07-02 ENCOUNTER — APPOINTMENT (OUTPATIENT)
Dept: CT IMAGING | Facility: HOSPITAL | Age: 40
End: 2025-07-02
Payer: COMMERCIAL

## 2025-07-02 VITALS
HEART RATE: 67 BPM | DIASTOLIC BLOOD PRESSURE: 90 MMHG | TEMPERATURE: 98.1 F | SYSTOLIC BLOOD PRESSURE: 128 MMHG | RESPIRATION RATE: 18 BRPM | OXYGEN SATURATION: 98 %

## 2025-07-02 DIAGNOSIS — S16.1XXA ACUTE STRAIN OF NECK MUSCLE, INITIAL ENCOUNTER: ICD-10-CM
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
## 2025-07-02 DIAGNOSIS — G44.86 CERVICOGENIC HEADACHE: ICD-10-CM

## 2025-07-02 PROCEDURE — 72125 CT NECK SPINE W/O DYE: CPT

## 2025-07-02 PROCEDURE — 99284 EMERGENCY DEPT VISIT MOD MDM: CPT

## 2025-07-02 PROCEDURE — 70450 CT HEAD/BRAIN W/O DYE: CPT

## 2025-07-02 RX ORDER — METHOCARBAMOL 750 MG/1
750 TABLET, FILM COATED ORAL 3 TIMES DAILY PRN
Qty: 15 TABLET | Refills: 0 | Status: SHIPPED | OUTPATIENT
Start: 2025-07-02

## 2025-07-02 RX ORDER — METHOCARBAMOL 750 MG/1
750 TABLET, FILM COATED ORAL ONCE
Status: COMPLETED | OUTPATIENT
Start: 2025-07-02 | End: 2025-07-02

## 2025-07-02 RX ORDER — IBUPROFEN 800 MG/1
800 TABLET, FILM COATED ORAL ONCE
Status: COMPLETED | OUTPATIENT
Start: 2025-07-02 | End: 2025-07-02

## 2025-07-02 RX ORDER — DICLOFENAC SODIUM 75 MG/1
75 TABLET, DELAYED RELEASE ORAL 2 TIMES DAILY
Qty: 14 TABLET | Refills: 0 | Status: SHIPPED | OUTPATIENT
Start: 2025-07-02

## 2025-07-02 RX ADMIN — IBUPROFEN 800 MG: 800 TABLET, FILM COATED ORAL at 19:51

## 2025-07-02 RX ADMIN — METHOCARBAMOL TABLETS 750 MG: 750 TABLET, COATED ORAL at 19:50

## 2025-07-02 NOTE — DISCHARGE INSTRUCTIONS
Return to the ER for altered mental status, vomiting, worsening headache, any concerns  Take the medications as prescribed, you can add tylenol 1000mg every 8 hours as needed for pain in addition to the prescribed medications
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
